# Patient Record
Sex: MALE | Race: WHITE | NOT HISPANIC OR LATINO | Employment: OTHER | URBAN - METROPOLITAN AREA
[De-identification: names, ages, dates, MRNs, and addresses within clinical notes are randomized per-mention and may not be internally consistent; named-entity substitution may affect disease eponyms.]

---

## 2023-11-17 ENCOUNTER — ANCILLARY PROCEDURE (OUTPATIENT)
Dept: RADIOLOGY | Facility: CLINIC | Age: 63
End: 2023-11-17
Payer: COMMERCIAL

## 2023-11-17 DIAGNOSIS — M79.675 PAIN IN TOE OF LEFT FOOT: ICD-10-CM

## 2023-11-17 PROCEDURE — 73660 X-RAY EXAM OF TOE(S): CPT | Mod: LEFT SIDE | Performed by: RADIOLOGY

## 2023-11-17 PROCEDURE — 73660 X-RAY EXAM OF TOE(S): CPT | Mod: LT,FY,FR

## 2023-12-14 ENCOUNTER — OFFICE VISIT (OUTPATIENT)
Dept: OPHTHALMOLOGY | Facility: CLINIC | Age: 63
End: 2023-12-14
Payer: COMMERCIAL

## 2023-12-14 DIAGNOSIS — H52.13 MYOPIA WITH PRESBYOPIA OF BOTH EYES: ICD-10-CM

## 2023-12-14 DIAGNOSIS — H52.4 MYOPIA WITH PRESBYOPIA OF BOTH EYES: ICD-10-CM

## 2023-12-14 DIAGNOSIS — H25.13 AGE-RELATED NUCLEAR CATARACT OF BOTH EYES: Primary | ICD-10-CM

## 2023-12-14 DIAGNOSIS — H40.003 GLAUCOMA SUSPECT OF BOTH EYES: ICD-10-CM

## 2023-12-14 PROCEDURE — FLVLF CONTACT LENS EVALUATION (SP): Performed by: STUDENT IN AN ORGANIZED HEALTH CARE EDUCATION/TRAINING PROGRAM

## 2023-12-14 PROCEDURE — 92015 DETERMINE REFRACTIVE STATE: CPT | Performed by: STUDENT IN AN ORGANIZED HEALTH CARE EDUCATION/TRAINING PROGRAM

## 2023-12-14 PROCEDURE — 92004 COMPRE OPH EXAM NEW PT 1/>: CPT | Performed by: STUDENT IN AN ORGANIZED HEALTH CARE EDUCATION/TRAINING PROGRAM

## 2023-12-14 PROCEDURE — V2522 CNTCT LENS HYDROPHIL BIFOCL: HCPCS | Performed by: STUDENT IN AN ORGANIZED HEALTH CARE EDUCATION/TRAINING PROGRAM

## 2023-12-14 ASSESSMENT — REFRACTION_MANIFEST
OD_AXIS: 002
OS_SPHERE: -4.00
OD_CYLINDER: +0.75
OD_ADD: +2.25
OS_AXIS: 005
OS_CYLINDER: +0.75
OD_SPHERE: PLANO
OS_ADD: +2.25
OD_SPHERE: -4.50
OS_SPHERE: PLANO
OD_SPHERE: -4.50
OS_SPHERE: -3.75
OS_AXIS: 003
METHOD_AUTOREFRACTION: 1
OS_CYLINDER: +0.75
OD_CYLINDER: +0.50
OD_AXIS: 012

## 2023-12-14 ASSESSMENT — VISUAL ACUITY
METHOD: SNELLEN - LINEAR
OS_CC: 20/20
CORRECTION_TYPE: CONTACTS
OS_CC+: -1
OD_CC: 20/20

## 2023-12-14 ASSESSMENT — CUP TO DISC RATIO
OD_RATIO: 0.6
OS_RATIO: 0.6

## 2023-12-14 ASSESSMENT — ENCOUNTER SYMPTOMS
PSYCHIATRIC NEGATIVE: 0
CONSTITUTIONAL NEGATIVE: 0
CARDIOVASCULAR NEGATIVE: 0
ENDOCRINE NEGATIVE: 0
NEUROLOGICAL NEGATIVE: 0
GASTROINTESTINAL NEGATIVE: 0
MUSCULOSKELETAL NEGATIVE: 0
HEMATOLOGIC/LYMPHATIC NEGATIVE: 0
ALLERGIC/IMMUNOLOGIC NEGATIVE: 0
RESPIRATORY NEGATIVE: 0
EYES NEGATIVE: 0

## 2023-12-14 ASSESSMENT — REFRACTION_CURRENTRX
OD_DIAMETER: 14.2
OS_BASECURVE: 8.6
OD_CYLINDER: SPHERE
OS_CYLINDER: SPHERE
OD_ADD: MEDIUM
OD_BRAND: AIR OPTIX HYDRAGLYDE MULTIFOCAL
OS_ADD: LOW
OD_SPHERE: -4.25
OS_BRAND: AIR OPTIX HYDRAGLYDE MULTIFOCAL
OD_BASECURVE: 8.6
OS_SPHERE: -3.50
OS_DIAMETER: 14.2

## 2023-12-14 ASSESSMENT — CONF VISUAL FIELD
OS_NORMAL: 1
OS_INFERIOR_NASAL_RESTRICTION: 0
OS_SUPERIOR_TEMPORAL_RESTRICTION: 0
OD_INFERIOR_TEMPORAL_RESTRICTION: 0
OS_SUPERIOR_NASAL_RESTRICTION: 0
OD_SUPERIOR_TEMPORAL_RESTRICTION: 0
OD_INFERIOR_NASAL_RESTRICTION: 0
OD_NORMAL: 1
METHOD: COUNTING FINGERS
OS_INFERIOR_TEMPORAL_RESTRICTION: 0
OD_SUPERIOR_NASAL_RESTRICTION: 0

## 2023-12-14 ASSESSMENT — TONOMETRY
OD_IOP_MMHG: 18
IOP_METHOD: GOLDMANN APPLANATION
OS_IOP_MMHG: 18

## 2023-12-14 ASSESSMENT — EXTERNAL EXAM - RIGHT EYE: OD_EXAM: NORMAL

## 2023-12-14 ASSESSMENT — REFRACTION_WEARINGRX
OD_SPHERE: -4.25
SPECS_TYPE: CONTACTS
OS_SPHERE: -3.50

## 2023-12-14 ASSESSMENT — EXTERNAL EXAM - LEFT EYE: OS_EXAM: NORMAL

## 2023-12-14 NOTE — PROGRESS NOTES
Assessment/Plan   Diagnoses and all orders for this visit:  Age-related nuclear cataract of both eyes  -non visually significant. Pt ed. Monitor at this time.  Myopia with presbyopia of both eyes  -New spec rx released today per patient request. Ocular health wnl for age OU. Monitor 1 year or sooner prn. Refraction billed today.  -Discussed proper wear, care, replacement of contact lenses. Gave handout. D/c cl wear and RTC if eyes become red, painful, irritated. Monitor 1 year.   CL eval billed today. $35 Finalized RX for Air Optix MF with good comfort/fit/VA. Stable RX no changes. Year supply ordered.   Glaucoma suspect of both eyes  -suspect based on enlarged cup-to-disc ratio (C/D) OU  -IOP today 18/18; historically normotensive  -ONH appearance overall stable to previous description  -Last OCT RNFL 2018 OD sup borderline thinning; OS wnl  -Last HVF 2018 clear non gl  -patient ed on findings; will obtain updated testing at next exam  -with normotensive IOP and stable ONH appearance low suspicion at this time    RTC for annual with DFE, MRX, CL exam, OCT RNFL, and PACHS

## 2023-12-14 NOTE — Clinical Note
Both eyes (OU) Contact Lens Order  Quantity: 4 Package: 6 PK Appointment needed? No Medically necessary? No Ship To: Home Additional instructions: Patient ordering year supply of Air Optix MF. Okay to send direct without appt. Thanks!

## 2023-12-15 PROBLEM — H52.4 MYOPIA WITH PRESBYOPIA OF BOTH EYES: Status: ACTIVE | Noted: 2023-12-15

## 2023-12-15 PROBLEM — H52.13 MYOPIA WITH PRESBYOPIA OF BOTH EYES: Status: ACTIVE | Noted: 2023-12-15

## 2023-12-15 PROBLEM — H25.13 AGE-RELATED NUCLEAR CATARACT OF BOTH EYES: Status: ACTIVE | Noted: 2023-12-15

## 2023-12-15 PROBLEM — H40.003 GLAUCOMA SUSPECT OF BOTH EYES: Status: ACTIVE | Noted: 2023-12-15

## 2025-04-17 ENCOUNTER — OFFICE VISIT (OUTPATIENT)
Dept: OPHTHALMOLOGY | Facility: CLINIC | Age: 65
End: 2025-04-17
Payer: COMMERCIAL

## 2025-04-17 DIAGNOSIS — H52.4 MYOPIA WITH PRESBYOPIA OF BOTH EYES: Primary | ICD-10-CM

## 2025-04-17 DIAGNOSIS — H25.13 AGE-RELATED NUCLEAR CATARACT OF BOTH EYES: ICD-10-CM

## 2025-04-17 DIAGNOSIS — H40.003 GLAUCOMA SUSPECT OF BOTH EYES: ICD-10-CM

## 2025-04-17 DIAGNOSIS — H52.13 MYOPIA WITH PRESBYOPIA OF BOTH EYES: Primary | ICD-10-CM

## 2025-04-17 LAB
AVERAGE RNFL BASELINE (OD): 79
AVERAGE RNFL BASELINE (OS): 79
AVERAGE RNFL TODAY (OD): 72
AVERAGE RNFL TODAY (OS): 77

## 2025-04-17 ASSESSMENT — REFRACTION_CURRENTRX
OS_BRAND: AIR OPTIX HYDRAGLYDE MULTIFOCAL
OS_SPHERE: -3.50
OS_ADD: LOW
OD_DIAMETER: 14.2
OD_ADD: MEDIUM
OD_CYLINDER: SPHERE
OS_BASECURVE: 8.6
OD_SPHERE: -4.25
OS_DIAMETER: 14.2
OD_BASECURVE: 8.6
OD_BRAND: AIR OPTIX HYDRAGLYDE MULTIFOCAL
OS_CYLINDER: SPHERE

## 2025-04-17 ASSESSMENT — CONF VISUAL FIELD
OD_NORMAL: 1
OS_SUPERIOR_NASAL_RESTRICTION: 0
METHOD: COUNTING FINGERS
OS_INFERIOR_NASAL_RESTRICTION: 0
OD_SUPERIOR_NASAL_RESTRICTION: 0
OS_INFERIOR_TEMPORAL_RESTRICTION: 0
OD_INFERIOR_TEMPORAL_RESTRICTION: 0
OS_NORMAL: 1
OD_SUPERIOR_TEMPORAL_RESTRICTION: 0
OD_INFERIOR_NASAL_RESTRICTION: 0
OS_SUPERIOR_TEMPORAL_RESTRICTION: 0

## 2025-04-17 ASSESSMENT — REFRACTION_MANIFEST
OD_ADD: +2.25
OS_ADD: +2.25
OS_CYLINDER: -0.50
OS_AXIS: 093
OD_CYLINDER: -0.75
OD_SPHERE: -3.75
OD_AXIS: 092
OS_SPHERE: -3.50

## 2025-04-17 ASSESSMENT — ENCOUNTER SYMPTOMS
MUSCULOSKELETAL NEGATIVE: 0
GASTROINTESTINAL NEGATIVE: 0
ALLERGIC/IMMUNOLOGIC NEGATIVE: 0
CONSTITUTIONAL NEGATIVE: 0
HEMATOLOGIC/LYMPHATIC NEGATIVE: 0
ENDOCRINE NEGATIVE: 0
NEUROLOGICAL NEGATIVE: 0
PSYCHIATRIC NEGATIVE: 0
RESPIRATORY NEGATIVE: 0
CARDIOVASCULAR NEGATIVE: 0
EYES NEGATIVE: 0

## 2025-04-17 ASSESSMENT — REFRACTION_WEARINGRX
OS_ADD: +2.25
OS_CYLINDER: -0.75
OD_AXIS: 092
OD_CYLINDER: -0.50
OD_ADD: +2.25
OS_AXIS: 093
OD_SPHERE: -4.00
OS_SPHERE: -3.25

## 2025-04-17 ASSESSMENT — EXTERNAL EXAM - LEFT EYE: OS_EXAM: NORMAL

## 2025-04-17 ASSESSMENT — CUP TO DISC RATIO
OS_RATIO: 0.6
OD_RATIO: 0.65

## 2025-04-17 ASSESSMENT — PACHYMETRY
EXAM_DATE: 4/17/2025
OS_CT(UM): 553
OD_CT(UM): 555

## 2025-04-17 ASSESSMENT — VISUAL ACUITY
OS_CC+: -2
VA_OR_OD_CURRENT_RX: 20/20
OD_CC+: -1
OD_CC: 20/20
CORRECTION_TYPE: CONTACTS
OS_CC: 20/20
VA_OR_OS_CURRENT_RX: 20/20
METHOD: SNELLEN - LINEAR
OS_CC: 20/20

## 2025-04-17 ASSESSMENT — TONOMETRY
OS_IOP_MMHG: 19
IOP_METHOD: GOLDMANN APPLANATION
OD_IOP_MMHG: 18

## 2025-04-17 ASSESSMENT — EXTERNAL EXAM - RIGHT EYE: OD_EXAM: NORMAL

## 2025-04-17 NOTE — PROGRESS NOTES
Assessment/Plan   Diagnoses and all orders for this visit:  Myopia with presbyopia of both eyes  New spec rx released today per patient request. Ocular health wnl for age OU. Monitor 1 year or sooner prn. Refraction billed today. Pt consents to receiving glasses Rx today. Patient's/guardian's signature obtained to acknowledge and confirm that a paper copy of glasses Rx was given to patient in compliance with [facility] Eyeglass Rule. Electronic copy of Rx will also be available via [employer].   Discussed proper wear, care, replacement of contact lenses. Gave handout. D/c cl wear and RTC if eyes become red, painful, irritated. Monitor 1 year.   CL eval billed today.$35 year supply of CLs ordered.   Consider reduce myopic correction OD in CL at next if near complaints worsen, patient doing well at this time.     Glaucoma suspect of both eyes  -     OCT, Optic Nerve - OU - Both Eyes  Discussed with patient. Patient voiced understanding of importance of f/up as undiagnosed/untreated glaucoma can lead to permanent vision loss. Baseline RNFL OCT today. RTC 3-4 months for HVF 24-2 Caprice Fast, IOP by applanation, gonio, SLE. Pt prefers [place] office.     Age-related nuclear cataract of both eyes  Patient's cataracts are not visually significant. Will monitor for changes. No indication for surgery at this time.    
operating room

## 2025-04-17 NOTE — Clinical Note
Both eyes (OU) Contact Lens Order Contact Lens Current Rx    Current Contact Lens Rx      Brand Base Curve Diameter Sphere Cylinder Add Lens Over-Sphere Over-Cyl    Right Air Optix Hydraglyde Multifocal 8.6 14.2 -4.25 Sphere medium add   Left Air Optix Hydraglyde Multifocal 8.6 14.2 -3.50 Sphere low add       Quantity: 4 Package: 6 PK Appointment needed? No Medically necessary? No Ship To: Home Additional instructions: Please order year supply and mail to patient. Thanks!